# Patient Record
Sex: MALE | Race: BLACK OR AFRICAN AMERICAN | Employment: PART TIME | URBAN - METROPOLITAN AREA
[De-identification: names, ages, dates, MRNs, and addresses within clinical notes are randomized per-mention and may not be internally consistent; named-entity substitution may affect disease eponyms.]

---

## 2020-01-31 ENCOUNTER — APPOINTMENT (OUTPATIENT)
Dept: CT IMAGING | Age: 30
End: 2020-01-31
Attending: EMERGENCY MEDICINE
Payer: SELF-PAY

## 2020-01-31 ENCOUNTER — HOSPITAL ENCOUNTER (EMERGENCY)
Age: 30
Discharge: HOME | End: 2020-01-31
Attending: EMERGENCY MEDICINE
Payer: SELF-PAY

## 2020-01-31 VITALS
BODY MASS INDEX: 32.2 KG/M2 | TEMPERATURE: 97.9 F | HEART RATE: 65 BPM | SYSTOLIC BLOOD PRESSURE: 142 MMHG | WEIGHT: 230 LBS | HEIGHT: 71 IN | OXYGEN SATURATION: 100 % | RESPIRATION RATE: 16 BRPM | DIASTOLIC BLOOD PRESSURE: 56 MMHG

## 2020-01-31 DIAGNOSIS — R42 DIZZINESS: Primary | ICD-10-CM

## 2020-01-31 DIAGNOSIS — H66.009 ACUTE SUPPURATIVE OTITIS MEDIA WITHOUT SPONTANEOUS RUPTURE OF EAR DRUM, RECURRENCE NOT SPECIFIED, UNSPECIFIED LATERALITY: ICD-10-CM

## 2020-01-31 LAB
GLUCOSE BLD STRIP.AUTO-MCNC: 102 MG/DL (ref 65–100)
SERVICE CMNT-IMP: ABNORMAL

## 2020-01-31 PROCEDURE — 82962 GLUCOSE BLOOD TEST: CPT

## 2020-01-31 PROCEDURE — 74011250636 HC RX REV CODE- 250/636: Performed by: EMERGENCY MEDICINE

## 2020-01-31 PROCEDURE — 93005 ELECTROCARDIOGRAM TRACING: CPT

## 2020-01-31 PROCEDURE — 70450 CT HEAD/BRAIN W/O DYE: CPT

## 2020-01-31 PROCEDURE — 99284 EMERGENCY DEPT VISIT MOD MDM: CPT

## 2020-01-31 RX ORDER — MECLIZINE HCL 12.5 MG 12.5 MG/1
25 TABLET ORAL
Status: COMPLETED | OUTPATIENT
Start: 2020-01-31 | End: 2020-01-31

## 2020-01-31 RX ORDER — MECLIZINE HYDROCHLORIDE 25 MG/1
25 TABLET ORAL
Qty: 20 TAB | Refills: 0 | Status: SHIPPED | OUTPATIENT
Start: 2020-01-31 | End: 2020-02-16

## 2020-01-31 RX ORDER — AMOXICILLIN 500 MG/1
500 TABLET, FILM COATED ORAL 3 TIMES DAILY
Qty: 30 TAB | Refills: 0 | Status: SHIPPED | OUTPATIENT
Start: 2020-01-31 | End: 2022-10-28

## 2020-01-31 RX ADMIN — MECLIZINE 25 MG: 12.5 TABLET ORAL at 19:03

## 2020-01-31 NOTE — ED PROVIDER NOTES
EMERGENCY DEPARTMENT HISTORY AND PHYSICAL EXAM      Date: 1/31/2020  Patient Name: Cameron Woodward    History of Presenting Illness     Chief Complaint   Patient presents with    Hypertension    Dizziness       History Provided By: Patient    HPI: Cameron Woodward, 34 y.o. male with PMHx significant for no medical history, presents by private vehicle to the ED with cc of dizziness. This is a 70-year-old male with dizziness that started about 6 hours ago. He feels off balance and has some trouble walking. He has no history of head injury though he had some vomiting about 2 hours ago. He states his blood pressure was a concern because of the dizziness. He assumed that elevated blood pressure is what was causing his dizziness. There is no indication that he has had any elevated blood pressures. Patient does have some mild right ear pain and fullness with no drainage. There are no other complaints, changes, or physical findings at this time. PCP: Cipriano oMss MD        Past History     Past Medical History:  History reviewed. No pertinent past medical history. Past Surgical History:  History reviewed. No pertinent surgical history. Family History:  History reviewed. No pertinent family history. Social History:  Social History     Tobacco Use    Smoking status: Never Smoker    Smokeless tobacco: Never Used   Substance Use Topics    Alcohol use: Not Currently    Drug use: Not Currently       Allergies: Allergies   Allergen Reactions    Dog Dander Sneezing    Pollen Extracts Itching and Sneezing         Review of Systems   Review of Systems   Constitutional: Negative for chills and fever. HENT: Negative for congestion, rhinorrhea, sneezing and sore throat. Eyes: Negative for redness and visual disturbance. Respiratory: Negative for shortness of breath. Cardiovascular: Negative for chest pain and leg swelling.    Gastrointestinal: Negative for abdominal pain, nausea and vomiting. Genitourinary: Negative for difficulty urinating and frequency. Musculoskeletal: Negative for back pain, myalgias and neck stiffness. Skin: Negative for rash. Neurological: Positive for dizziness. Negative for syncope, weakness and headaches. Hematological: Negative for adenopathy. All other systems reviewed and are negative. Physical Exam   Physical Exam  Vitals signs and nursing note reviewed. Constitutional:       Appearance: Normal appearance. He is well-developed. HENT:      Head: Normocephalic and atraumatic. Ears:      Comments: Serous effusion is noted of the right ear. Neck:      Musculoskeletal: Full passive range of motion without pain, normal range of motion and neck supple. Cardiovascular:      Rate and Rhythm: Normal rate and regular rhythm. Pulses: Normal pulses. Heart sounds: Normal heart sounds. No murmur. Pulmonary:      Effort: Pulmonary effort is normal. No respiratory distress. Breath sounds: Normal breath sounds. Chest:      Chest wall: No tenderness. Abdominal:      General: Bowel sounds are normal.      Palpations: Abdomen is soft. Tenderness: There is no abdominal tenderness. There is no guarding or rebound. Skin:     General: Skin is warm and dry. Findings: No erythema or rash. Neurological:      Mental Status: He is alert and oriented to person, place, and time. Psychiatric:         Speech: Speech normal.         Behavior: Behavior normal.         Thought Content: Thought content normal.         Judgment: Judgment normal.         Diagnostic Study Results     Labs -     No results found for this or any previous visit (from the past 12 hour(s)). Radiologic Studies -   CT HEAD WO CONT   Final Result   IMPRESSION: No acute intracranial hemorrhage, mass or infarct.              CT Results  (Last 48 hours)               01/31/20 1919  CT HEAD WO CONT Final result    Impression:  IMPRESSION: No acute intracranial hemorrhage, mass or infarct. Narrative:  INDICATION: dizziness        Exam: Noncontrast CT of the brain is performed with 5 mm collimation. CT dose reduction was achieved with the use of the standardized protocol   tailored for this examination and automatic exposure control for dose   modulation. FINDINGS: There is no acute intracranial hemorrhage, mass, mass effect or   herniation. Ventricular system is normal. The gray-white matter differentiation   is well-preserved. The mastoid air cells are well pneumatized. The visualized   paranasal sinuses are normal.               CXR Results  (Last 48 hours)    None            Medical Decision Making   I am the first provider for this patient. I reviewed the vital signs, available nursing notes, past medical history, past surgical history, family history and social history. Vital Signs-Reviewed the patient's vital signs. No data found. Records Reviewed: Nursing Notes    Provider Notes (Medical Decision Making): Intracranial process causing dizziness and vomiting versus hypoglycemia versus abnormal heart rhythm. EKG shows a normal sinus rhythm at 63 bpm.  Fingerstick glucose was right around 100. CT head is also performed today. ED Course:   Initial assessment performed. The patients presenting problems have been discussed, and they are in agreement with the care plan formulated and outlined with them. I have encouraged them to ask questions as they arise throughout their visit. Disposition:  Patient informed of results of workup and is comfortable with discharge to home to follow up with PCP. They are instructed to return as needed for worsening condition. PLAN:  1. Discharge Medication List as of 1/31/2020  7:30 PM      START taking these medications    Details   amoxicillin 500 mg tab Take 500 mg by mouth three (3) times daily. , Normal, Disp-30 Tab, R-0      meclizine (ANTIVERT) 25 mg tablet Take 1 Tab by mouth three (3) times daily as needed for Dizziness., Normal, Disp-20 Tab, R-0           2. Follow-up Information     Follow up With Specialties Details Why Harriett Rivera MD Orthopedic Surgery Schedule an appointment as soon as possible for a visit  [address]  [phone]      [facility] EMERGENCY DEPT Emergency Medicine  As needed, If symptoms worsen Lev Alberto  [phone]        Return to ED if worse     Diagnosis     Clinical Impression:   1. Dizziness    2.  Acute suppurative otitis media without spontaneous rupture of ear drum, recurrence not specified, unspecified laterality

## 2020-01-31 NOTE — LETTER
[facility] EMERGENCY DEPT 
[address] [facility] [address] 
[phone] Work/School Note Date: 1/31/2020 To Whom It May concern: 
 
Megan Castorena was seen and treated today in the emergency room by the following provider(s): 
Attending Provider: Chey No MD.   
 
Megan Castorena may return to work on 2/3/20. Sincerely, Darrion Mcarthur RN

## 2020-01-31 NOTE — DISCHARGE INSTRUCTIONS
Patient Education        Dizziness: Care Instructions  Your Care Instructions  Dizziness is the feeling of unsteadiness or fuzziness in your head. It is different than having vertigo, which is a feeling that the room is spinning or that you are moving or falling. It is also different from lightheadedness, which is the feeling that you are about to faint. It can be hard to know what causes dizziness. Some people feel dizzy when they have migraine headaches. Sometimes bouts of flu can make you feel dizzy. Some medical conditions, such as heart problems or high blood pressure, can make you feel dizzy. Many medicines can cause dizziness, including medicines for high blood pressure, pain, or anxiety. If a medicine causes your symptoms, your doctor may recommend that you stop or change the medicine. If it is a problem with your heart, you may need medicine to help your heart work better. If there is no clear reason for your symptoms, your doctor may suggest watching and waiting for a while to see if the dizziness goes away on its own. Follow-up care is a key part of your treatment and safety. Be sure to make and go to all appointments, and call your doctor if you are having problems. It's also a good idea to know your test results and keep a list of the medicines you take. How can you care for yourself at home? · If your doctor recommends or prescribes medicine, take it exactly as directed. Call your doctor if you think you are having a problem with your medicine. · Do not drive while you feel dizzy. · Try to prevent falls. Steps you can take include:  ? Using nonskid mats, adding grab bars near the tub, and using night-lights. ? Clearing your home so that walkways are free of anything you might trip on.  ? Letting family and friends know that you have been feeling dizzy. This will help them know how to help you. When should you call for help? Call 911 anytime you think you may need emergency care.  For example, call if:    · You passed out (lost consciousness).     · You have dizziness along with symptoms of a heart attack. These may include:  ? Chest pain or pressure, or a strange feeling in the chest.  ? Sweating. ? Shortness of breath. ? Nausea or vomiting. ? Pain, pressure, or a strange feeling in the back, neck, jaw, or upper belly or in one or both shoulders or arms. ? Lightheadedness or sudden weakness. ? A fast or irregular heartbeat.     · You have symptoms of a stroke. These may include:  ? Sudden numbness, tingling, weakness, or loss of movement in your face, arm, or leg, especially on only one side of your body. ? Sudden vision changes. ? Sudden trouble speaking. ? Sudden confusion or trouble understanding simple statements. ? Sudden problems with walking or balance. ? A sudden, severe headache that is different from past headaches.    Call your doctor now or seek immediate medical care if:    · You feel dizzy and have a fever, headache, or ringing in your ears.     · You have new or increased nausea and vomiting.     · Your dizziness does not go away or comes back.    Watch closely for changes in your health, and be sure to contact your doctor if:    · You do not get better as expected. Where can you learn more? Go to http://kory-alysha.info/. Enter A887 in the search box to learn more about \"Dizziness: Care Instructions. \"  Current as of: June 26, 2019  Content Version: 12.2  © 3300-7691 [employer]. Care instructions adapted under license by [employer] (which disclaims liability or warranty for this information). If you have questions about a medical condition or this instruction, always ask your healthcare professional. [place] [address] any warranty or liability for your use of this information.          Patient Education        Vertigo: Care Instructions  Your Care Instructions    Vertigo is the feeling that you or your surroundings are moving when there is no actual movement. It is often described as a feeling of spinning, whirling, falling, or tilting. Vertigo may make you vomit or feel nauseated. You may have trouble standing or walking and may lose your balance. Vertigo is often related to an inner ear problem, but it can have other more serious causes. If vertigo continues, you may need more tests to find its cause. Follow-up care is a key part of your treatment and safety. Be sure to make and go to all appointments, and call your doctor if you are having problems. It's also a good idea to know your test results and keep a list of the medicines you take. How can you care for yourself at home? · Do not lie flat on your back. Prop yourself up slightly. This may reduce the spinning feeling. Keep your eyes open. · Move slowly so that you do not fall. · If your doctor recommends medicine, take it exactly as directed. · Do not drive while you are having vertigo. Certain exercises, called Bass-Daroff exercises, can help decrease vertigo. To do Bass-Daroff exercises:  · Sit on the edge of a bed or sofa and quickly lie down on the side that causes the worst vertigo. Lie on your side with your ear down. · Stay in this position for at least 30 seconds or until the vertigo goes away. · Sit up. If this causes vertigo, wait for it to stop. · Repeat the procedure on the other side. · Repeat this 10 times. Do these exercises 2 times a day until the vertigo is gone. When should you call for help? Call 911 anytime you think you may need emergency care. For example, call if:    · You passed out (lost consciousness).     · You have symptoms of a stroke. These may include:  ? Sudden numbness, tingling, weakness, or loss of movement in your face, arm, or leg, especially on only one side of your body. ? Sudden vision changes. ? Sudden trouble speaking. ? Sudden confusion or trouble understanding simple statements.   ? Sudden problems with walking or balance. ? A sudden, severe headache that is different from past headaches.    Call your doctor now or seek immediate medical care if:    · Vertigo occurs with a fever, a headache, or ringing in your ears.     · You have new or increased nausea and vomiting.    Watch closely for changes in your health, and be sure to contact your doctor if:    · Vertigo gets worse or happens more often.     · Vertigo has not gotten better after 2 weeks. Where can you learn more? Go to http://kory-alysha.info/. Enter X443 in the search box to learn more about \"Vertigo: Care Instructions. \"  Current as of: October 21, 2018  Content Version: 12.2  © 9957-7394 [employer]. Care instructions adapted under license by [employer] (which disclaims liability or warranty for this information). If you have questions about a medical condition or this instruction, always ask your healthcare professional. [address] any warranty or liability for your use of this information.

## 2020-02-01 LAB
ATRIAL RATE: 63 BPM
CALCULATED P AXIS, ECG09: 58 DEGREES
CALCULATED R AXIS, ECG10: 66 DEGREES
CALCULATED T AXIS, ECG11: 27 DEGREES
DIAGNOSIS, 93000: NORMAL
P-R INTERVAL, ECG05: 158 MS
Q-T INTERVAL, ECG07: 406 MS
QRS DURATION, ECG06: 88 MS
QTC CALCULATION (BEZET), ECG08: 415 MS
VENTRICULAR RATE, ECG03: 63 BPM

## 2020-02-01 NOTE — ED NOTES
Patient here with c/o dizziness. Patient states symptoms x3 days. Patient denies injury or fall. States symptoms have been intermittent each day. Patient reports vomiting x2 episodes this afternoon. Denies fevers. Patient reports hearing loss and irritation to right ear. Denies hx of previous health problems. Denies being on any home medications. Denies smoking, ETOH, or substance abuse. Patient's wife states he ate a salad around 11am today and she states that he ate ten ice cream Snickers bars last night. Emergency Department Nursing Plan of Care       The Nursing Plan of Care is developed from the Nursing assessment and Emergency Department Attending provider initial evaluation. The plan of care may be reviewed in the ED Provider note.     The Plan of Care was developed with the following considerations:   Patient / Family readiness to learn indicated by:verbalized understanding  Persons(s) to be included in education: patient  Barriers to Learning/Limitations:No    Signed     Olivia Wang RN    1/31/2020   7:29 PM

## 2020-02-01 NOTE — ED NOTES
Discharge instructions were given to the patient by Narendra Wellington RN. The patient left the Emergency Department ambulatory, alert and oriented and in no acute distress with 2 electronic prescriptions. The patient was encouraged to call or return to the ED for worsening issues or problems and was encouraged to schedule a follow up appointment for continuing care. The patient verbalized understanding of discharge instructions and prescriptions, all questions were answered. The patient has no further concerns at this time.

## 2020-02-01 NOTE — ED NOTES
Bedside and Verbal shift change report given to Koko Carvajal RN (oncoming nurse) by Sam Sherwood RN (offgoing nurse). Report included the following information SBAR, Kardex, ED Summary, Procedure Summary, MAR and Recent Results.

## 2020-02-16 ENCOUNTER — HOSPITAL ENCOUNTER (EMERGENCY)
Age: 30
Discharge: HOME | End: 2020-02-16
Attending: EMERGENCY MEDICINE
Payer: SELF-PAY

## 2020-02-16 VITALS
BODY MASS INDEX: 39.2 KG/M2 | SYSTOLIC BLOOD PRESSURE: 125 MMHG | DIASTOLIC BLOOD PRESSURE: 96 MMHG | TEMPERATURE: 97.7 F | RESPIRATION RATE: 18 BRPM | HEART RATE: 73 BPM | HEIGHT: 71 IN | WEIGHT: 280 LBS | OXYGEN SATURATION: 98 %

## 2020-02-16 DIAGNOSIS — H61.21 EXCESSIVE CERUMEN IN RIGHT EAR CANAL: ICD-10-CM

## 2020-02-16 DIAGNOSIS — H81.01 MENIERE DISEASE, RIGHT: Primary | ICD-10-CM

## 2020-02-16 PROCEDURE — 99283 EMERGENCY DEPT VISIT LOW MDM: CPT

## 2020-02-16 PROCEDURE — 76010010392 HC REMOVAL IMPACTED WAX IRRIGATION/LVG UNI

## 2020-02-16 PROCEDURE — 74011250637 HC RX REV CODE- 250/637: Performed by: EMERGENCY MEDICINE

## 2020-02-16 RX ORDER — DOCUSATE SODIUM 50 MG/5ML
100 LIQUID ORAL
Status: COMPLETED | OUTPATIENT
Start: 2020-02-16 | End: 2020-02-16

## 2020-02-16 RX ORDER — FLUTICASONE PROPIONATE 50 MCG
2 SPRAY, SUSPENSION (ML) NASAL DAILY
Qty: 1 BOTTLE | Refills: 0 | Status: SHIPPED | OUTPATIENT
Start: 2020-02-16

## 2020-02-16 RX ORDER — CETIRIZINE HCL 10 MG
10 TABLET ORAL DAILY
Qty: 30 TAB | Refills: 0 | Status: SHIPPED | OUTPATIENT
Start: 2020-02-16 | End: 2022-10-28

## 2020-02-16 RX ORDER — TRIAMTERENE AND HYDROCHLOROTHIAZIDE 37.5; 25 MG/1; MG/1
1 CAPSULE ORAL DAILY
Qty: 30 CAP | Refills: 0 | Status: SHIPPED | OUTPATIENT
Start: 2020-02-16 | End: 2022-10-28

## 2020-02-16 RX ORDER — DOCUSATE SODIUM 100 MG/1
100 CAPSULE, LIQUID FILLED ORAL
Status: DISCONTINUED | OUTPATIENT
Start: 2020-02-16 | End: 2020-02-16

## 2020-02-16 RX ORDER — MECLIZINE HYDROCHLORIDE 25 MG/1
25 TABLET ORAL
Qty: 30 TAB | Refills: 0 | Status: SHIPPED | OUTPATIENT
Start: 2020-02-16

## 2020-02-16 RX ADMIN — DOCUSATE SODIUM 100 MG: 50 LIQUID ORAL at 10:41

## 2020-02-16 NOTE — ED PROVIDER NOTES
EMERGENCY DEPARTMENT HISTORY AND PHYSICAL EXAM      Date: 2/16/2020  Patient Name: Wojciech Ventura    History of Presenting Illness     Chief Complaint   Patient presents with    Dizziness     guicho four months    Ear Pain     right ear \"ringing\"       History Provided By: Patient and Girlfriend    HPI: Wojciech Ventura, 34 y.o. male presents to the ED with cc of recurrent dizziness, decreased hearing and fullness in his ear. The patient states he has been having these symptoms for at least a year. He in fact was here recently and had a work-up including head CT that was unremarkable. He does have a history of sinus allergies. He denies any current sinus pain, headache, diplopia, dysarthria or ataxia. The dizziness is described as room spinning and does last for over 20 to 30 minutes at a time. It does appear to be positional at times. He has concurrent ringing in his ear. He denies any associated ear pain. No family history of aneurysms or cancer. There are no other complaints, changes, or physical findings at this time. PCP: Jesse Fernandez MD    No current facility-administered medications on file prior to encounter. Current Outpatient Medications on File Prior to Encounter   Medication Sig Dispense Refill    amoxicillin 500 mg tab Take 500 mg by mouth three (3) times daily. 30 Tab 0    [DISCONTINUED] meclizine (ANTIVERT) 25 mg tablet Take 1 Tab by mouth three (3) times daily as needed for Dizziness. 20 Tab 0       Past History     Past Medical History:  No past medical history on file. Past Surgical History:  No past surgical history on file. Family History:  No family history on file. Social History:  Social History     Tobacco Use    Smoking status: Never Smoker    Smokeless tobacco: Never Used   Substance Use Topics    Alcohol use: Not Currently    Drug use: Not Currently       Allergies:   Allergies   Allergen Reactions    Dog Dander Sneezing    Pollen Extracts Itching and Sneezing         Review of Systems   Review of Systems   Constitutional: Negative. Negative for chills and fever. HENT: Positive for hearing loss. Negative for congestion, ear pain, rhinorrhea and sinus pressure. Eyes: Negative. Negative for photophobia, discharge and redness. Respiratory: Negative. Negative for chest tightness and shortness of breath. Cardiovascular: Negative. Negative for chest pain. Gastrointestinal: Negative. Negative for abdominal pain, blood in stool, nausea and vomiting. Endocrine: Negative. Genitourinary: Negative. Negative for flank pain and hematuria. Musculoskeletal: Negative. Negative for back pain. Skin: Negative. Negative for rash. Neurological: Positive for dizziness. Negative for seizures, speech difficulty, weakness, numbness and headaches. Hematological: Negative. All other systems reviewed and are negative. Physical Exam   Physical Exam  Vitals signs and nursing note reviewed. Constitutional:       General: He is not in acute distress. Appearance: He is well-developed. He is not diaphoretic. HENT:      Head: Normocephalic and atraumatic. Right Ear: There is impacted cerumen. No mastoid tenderness. Left Ear: Hearing and tympanic membrane normal. There is no impacted cerumen. No mastoid tenderness. Tympanic membrane is not injected or scarred. Nose: Nose normal.      Mouth/Throat:      Pharynx: No oropharyngeal exudate. Eyes:      General: No scleral icterus. Conjunctiva/sclera: Conjunctivae normal.      Pupils: Pupils are equal, round, and reactive to light. Neck:      Musculoskeletal: Normal range of motion and neck supple. Thyroid: No thyromegaly. Vascular: No JVD. Cardiovascular:      Rate and Rhythm: Normal rate and regular rhythm. Chest Wall: PMI is not displaced. Pulses: Normal pulses. Heart sounds: Normal heart sounds. No murmur. No friction rub. No gallop. Pulmonary:      Effort: Pulmonary effort is normal. No respiratory distress. Breath sounds: Normal breath sounds. No stridor. No decreased breath sounds, wheezing, rhonchi or rales. Chest:      Chest wall: No tenderness. Abdominal:      General: Bowel sounds are normal. There is no distension or abdominal bruit. Palpations: Abdomen is soft. There is no mass. Tenderness: There is no abdominal tenderness. There is no guarding or rebound. Hernia: No hernia is present. Skin:     General: Skin is warm. Findings: No erythema or rash. Neurological:      Mental Status: He is alert and oriented to person, place, and time. GCS: GCS eye subscore is 4. GCS verbal subscore is 5. GCS motor subscore is 6. Cranial Nerves: No cranial nerve deficit. Sensory: No sensory deficit. Motor: No tremor, atrophy, abnormal muscle tone or seizure activity. Coordination: Coordination normal.      Deep Tendon Reflexes: Reflexes are normal and symmetric. Reflexes normal.      Reflex Scores:       Patellar reflexes are 2+ on the right side and 2+ on the left side. Comments: Not able to elicit dizziness with tilting and turning them patient's head from an upright to supine position       11:25 AM after review of up-to-date literature will initiate triamterene hydrochlorothiazide to treat presumed Ménière's disease. Patient made aware of this plan and agrees. We will also treat for sinus allergies. At this point we will withhold antibiotics as he just had antibiotics for 10 days. Reinforced the importance of following up with ear nose and throat doctor. 12:04 PM states she was able to remove a goodly amount of wax from the patient's ear. Plan will continue to be the same to treat for possible Ménière's follow-up ENT. Diagnostic Study Results     Labs -   No results found for this or any previous visit (from the past 12 hour(s)).     Radiologic Studies -   No orders to display CT Results  (Last 48 hours)    None        CXR Results  (Last 48 hours)    None          Medical Decision Making   I am the first provider for this patient. I reviewed the vital signs, available nursing notes, past medical history, past surgical history, family history and social history. Vital Signs-Reviewed the patient's vital signs. Patient Vitals for the past 12 hrs:   Temp Pulse Resp BP SpO2   02/16/20 0950 97.7 °F (36.5 °C) 73 18 (!) 126/93 98 %             Records Reviewed: Nursing Notes, Old Medical Records, Previous Radiology Studies and Previous Laboratory Studies    Provider Notes (Medical Decision Making):   Differential diagnosis-knee areas disease, labyrinthitis, cerumen impaction, otitis media, eustachian tube dysfunction, sinusitis, tumor, CVA, arrhythmia, electrolyte abnormality, anemia    Impression/plan-34year-old black male with recurrent spells of dizziness and associated decreased hearing. There is tinnitus. Exam does reveal cerumen. This may be a culprit. However there is a component of symptoms that suggest possibly Ménière's. He has had a recent head CT that was unremarkable. He denies any ear pain. Throughout his presentation he has not followed up with ENT. He was on antibiotics. He does get some relief with meclizine. I am will be to treat his cerumen impaction. May initiate thiazide diuretic to help with salt extraction. Will encourage ENT follow-up. ED Course:   Initial assessment performed. The patients presenting problems have been discussed, and they are in agreement with the care plan formulated and outlined with them. I have encouraged them to ask questions as they arise throughout their visit. Critical Care Time:   0    Disposition:  Home    PLAN:  1. Current Discharge Medication List      START taking these medications    Details   triamterene-hydroCHLOROthiazide (DYAZIDE) 37.5-25 mg per capsule Take 1 Cap by mouth daily.   Qty: 30 Cap, Refills: 0      fluticasone propionate (FLONASE) 50 mcg/actuation nasal spray 2 Sprays by Both Nostrils route daily. Qty: 1 Bottle, Refills: 0      cetirizine (ZYRTEC) 10 mg tablet Take 1 Tab by mouth daily. Qty: 30 Tab, Refills: 0         CONTINUE these medications which have CHANGED    Details   meclizine (ANTIVERT) 25 mg tablet Take 1 Tab by mouth three (3) times daily as needed for Dizziness. Qty: 30 Tab, Refills: 0           2. Follow-up Information     Follow up With Specialties Details Why Mark Larios MD Otolaryngology Schedule an appointment as soon as possible for a visit in 1 day  [address]  [facility] 7 [phone]      [facility] EM[facility]GENCY DEPT Emergency Medicine  If symptoms worsen [place]  [phone]        Return to ED if worse     Diagnosis     Clinical Impression:   1. Meniere disease, right    2. Excessive cerumen in right ear canal        Attestations:    Macho Mai MD    Please note that this dictation was completed with [employer], the [employer] voice recognition software. Quite often unanticipated grammatical, syntax, homophones, and other interpretive errors are inadvertently transcribed by the computer software. Please disregard these errors. Please excuse any errors that have escaped final proofreading. Thank you.

## 2020-02-16 NOTE — DISCHARGE INSTRUCTIONS
Patient Education        Ménière's Disease: Care Instructions  Your Care Instructions    Ménière's (say \"men-YEERS\") disease is a problem of the inner ear that affects hearing and balance. It causes sudden attacks of vertigo that make you feel like you are spinning. It can also cause a loud ringing in the ears called tinnitus, a temporary loss of hearing, and a feeling of fullness in the ear. Your hearing loss may not get better. The cause of Ménière's disease is not known, but it may be related to a fluid imbalance in the inner ear. The goal of treatment is to make the vertigo less severe until the attack ends. Some people can prevent attacks by eating a diet low in sodium and by doing exercises to improve balance. Medicines may also help. Surgery is an option for some people. Follow-up care is a key part of your treatment and safety. Be sure to make and go to all appointments, and call your doctor if you are having problems. It's also a good idea to know your test results and keep a list of the medicines you take. How can you care for yourself at home? · During an attack of vertigo, lie down and hold your head very still until the feeling passes. This may help you cope with vertigo. · Take your medicines exactly as prescribed. Call your doctor if you think you are having a problem with your medicine. You will get more details on the specific medicines your doctor prescribes. · Avoid caffeine, alcohol, tobacco, and stress, along with any other substances or conditions that trigger an attack. · Eat a diet low in sodium to reduce fluid buildup in the inner ear. · Do exercises to improve your balance. These can help ease vertigo. ? Stand with your feet together, arms at your sides, and hold this position for 30 seconds. ? For slightly harder exercise, stand with your feet together and arms at your sides while slowly moving your head up and down and side to side. ? Keep a chart to track your progress.  It can make you aware of any improvements. Include the date, the time you spent exercising, how often your eyes were open or closed, and how you felt during each exercise. ? Walk 5 steps and then stop abruptly. Wait for any dizzy feeling to go away, and do it again. Repeat until you have walked about 50 feet. Walking exercises for vertigo may improve your balance and your symptoms of vertigo. You may want to have someone next to you while you do these exercises in case you lose your balance. ? For another walking exercise, walk 5 steps, turn, and walk back. Wait for any dizziness to go away. Repeat 5 more times. ? For harder exercise, walk and turn your head to the right or left with every other step. Try to walk about 50 feet. Repeat the exercise while moving your head up and down. Then repeat while moving your head up to one side and down to the other side, and then switching. ? Prepare a list that shows the distance you walked, how often you walked, and how you felt while you were walking. · Make sure your home is safe for those times when you have an attack of vertigo. ? Get rid of throw rugs, and use nonskid mats. ? Use grab bars near the bathtub and toilet. ? Use night-lights. ? Keep floors dry to prevent slipping. ? Store items you use often on low shelves so you do not have to climb or reach high. If you have to climb, use a step stool with handrails. ? Keep driveways, sidewalks, and other walkways clear of things that might cause you to trip. · There are other steps you can take to stay safe. ? Avoid driving or working at heights. ? Wear shoes with low heels and nonslip soles. ? Keep your shoes tied. ? Alert family and friends to your condition. ? Know whether medicines you take can affect your sense of balance. When should you call for help? Call 911 anytime you think you may need emergency care.  For example, call if:    · You passed out (lost consciousness).     · You have symptoms of a stroke. These may include:  ? Sudden numbness, tingling, weakness, or loss of movement in your face, arm, or leg, especially on only one side of your body. ? Sudden vision changes. ? Sudden trouble speaking. ? Sudden confusion or trouble understanding simple statements. ? Sudden problems with walking or balance. ? A sudden, severe headache that is different from past headaches.    Call your doctor now or seek immediate medical care if:    · You have new or worse dizziness.     · You notice changes in your hearing.    Watch closely for changes in your health, and be sure to contact your doctor if:    · You have new or worse nausea or vomiting.     · Your vertigo gets worse.     · You do not get better as expected. Where can you learn more? Go to http://kory-alysha.info/. Enter B908 in the search box to learn more about \"Ménière's Disease: Care Instructions. \"  Current as of: October 21, 2018  Content Version: 12.2  © 4246-2875 [employer]. Care instructions adapted under license by [employer] (which disclaims liability or warranty for this information). If you have questions about a medical condition or this instruction, always ask your healthcare professional. [place] [address] any warranty or liability for your use of this information. Patient Education        Earwax Blockage: Care Instructions  Your Care Instructions    Earwax is a natural substance that protects the ear canal. Normally, earwax drains from the ears and does not cause problems. Sometimes earwax builds up and hardens. Earwax blockage (also called cerumen impaction) can cause some loss of hearing and pain. When wax is tightly packed, you will need to have your doctor remove it. Follow-up care is a key part of your treatment and safety. Be sure to make and go to all appointments, and call your doctor if you are having problems.  It's also a good idea to know your test results and keep a list of the medicines you take. How can you care for yourself at home? · Do not try to remove earwax with cotton swabs, fingers, or other objects. This can make the blockage worse and damage the eardrum. · If your doctor recommends that you try to remove earwax at home:  ? Soften and loosen the earwax with warm mineral oil. You also can try hydrogen peroxide mixed with an equal amount of room temperature water. Place 2 drops of the fluid, warmed to body temperature, in the ear two times a day for up to 5 days. ? Once the wax is loose and soft, all that is usually needed to remove it from the ear canal is a gentle, warm shower. Direct the water into the ear, then tip your head to let the earwax drain out. Dry your ear thoroughly with a hair dryer set on low. Hold the dryer several inches from your ear. ? If the warm mineral oil and shower do not work, use an over-the-counter wax softener. Read and follow all instructions on the label. After using the wax softener, use an ear syringe to gently flush the ear. Make sure the flushing solution is body temperature. Cool or hot fluids in the ear can cause dizziness. When should you call for help? Call your doctor now or seek immediate medical care if:    · Pus or blood drains from your ear.     · Your ears are ringing or feel full.     · You have a loss of hearing.    Watch closely for changes in your health, and be sure to contact your doctor if:    · You have pain or reduced hearing after 1 week of home treatment.     · You have any new symptoms, such as nausea or balance problems. Where can you learn more? Go to http://kory-alysha.info/. Enter K902 in the search box to learn more about \"Earwax Blockage: Care Instructions. \"  Current as of: June 26, 2019  Content Version: 12.2  © 7970-0450 [employer].  Care instructions adapted under license by [employer] (which disclaims liability or warranty for this information). If you have questions about a medical condition or this instruction, always ask your healthcare professional. [address] any warranty or liability for your use of this information. [employer] Activation    Thank you for requesting access to [employer]. Please follow the instructions below to securely access and download your online medical record. [employer] allows you to send messages to your doctor, view your test results, renew your prescriptions, schedule appointments, and more. How Do I Sign Up? 1. In your internet browser, go to [email or web address])  2. Click on the First Time User? Click Here link in the Sign In box. You will be redirect to the New Member Sign Up page. 3. Enter your [employer] Access Code exactly as it appears below. You will not need to use this code after youve completed the sign-up process. If you do not sign up before the expiration date, you must request a new code. [employer] Access Code: [email or web address]  Expires: 3/16/2020  5:40 PM (This is the date your [employer] access code will )    4. Enter the last four digits of your Social Security Number (xxxx) and Date of Birth (mm/dd/yyyy) as indicated and click Submit. You will be taken to the next sign-up page. 5. Create a [employer] ID. This will be your [employer] login ID and cannot be changed, so think of one that is secure and easy to remember. 6. Create a [employer] password. You can change your password at any time. 7. Enter your Password Reset Question and Answer. This can be used at a later time if you forget your password. 8. Enter your e-mail address. You will receive e-mail notification when new information is available in [address] 9. Click Sign Up. You can now view and download portions of your medical record. 10. Click the Download Summary menu link to download a portable copy of your medical information.     Additional Information    If you have questions, please visit the Frequently Asked Questions section of the [employer] website at [email or web address] Remember, [employer] is NOT to be used for urgent needs. For medical emergencies, dial 911.

## 2020-02-16 NOTE — LETTER
[address] EMERGENCY DEPT 
[address] [place] [id number] 
[phone] Work/School Note Date: 2/16/2020 To Whom It May concern: 
 
Gabby Yang was seen and treated today in the emergency room by the following provider(s): 
Attending Provider: Rosalba Moya MD.   
 
Gabby Yang may return to work on February 18, 2020. Sincerely, Jesusita Yeager MD

## 2020-02-16 NOTE — ED NOTES
Patient (s) 1 given copy of dc instructions and 0 paper script(s) and 4electronic scripts. Patient (s)  verbalized understanding of instructions and script (s). Patient given a current medication reconciliation form and verbalized understanding of their medications. Patient (s) verbalized understanding of the importance of discussing medications with  his or her physician or clinic they will be following up with. Patient alert and oriented and in no acute distress.

## 2020-02-16 NOTE — ED NOTES
Patient presents to ED with c/o right ear pain and dizziness for four months. Patient seen here recently for same issue and prescribed medication that he took that he states that helped some what. Patient is alert and oriented x 4 and in no acute distress at this time. Respirations are at a regular rate, depth, and pattern. Patient updated on plan of care and has no questions or concerns at this time. Call bell within reach. Will continue to monitor. Please reference nursing assessment. Emergency Department Nursing Plan of Care       The Nursing Plan of Care is developed from the Nursing assessment and Emergency Department Attending provider initial evaluation. The plan of care may be reviewed in the ED Provider note.     The Plan of Care was developed with the following considerations:   Patient / Family readiness to learn indicated by:verbalized understanding and successful return demonstration  Persons(s) to be included in education: patient  Barriers to Learning/Limitations:No    Signed     [address]unez RN    2/16/2020   10:17 AM

## 2022-10-28 ENCOUNTER — HOSPITAL ENCOUNTER (EMERGENCY)
Age: 32
Discharge: HOME | End: 2022-10-28
Attending: STUDENT IN AN ORGANIZED HEALTH CARE EDUCATION/TRAINING PROGRAM
Payer: COMMERCIAL

## 2022-10-28 VITALS
BODY MASS INDEX: 39.9 KG/M2 | SYSTOLIC BLOOD PRESSURE: 132 MMHG | HEART RATE: 78 BPM | DIASTOLIC BLOOD PRESSURE: 71 MMHG | OXYGEN SATURATION: 97 % | HEIGHT: 71 IN | TEMPERATURE: 98 F | RESPIRATION RATE: 16 BRPM | WEIGHT: 285 LBS

## 2022-10-28 DIAGNOSIS — H81.12 BPPV (BENIGN PAROXYSMAL POSITIONAL VERTIGO), LEFT: ICD-10-CM

## 2022-10-28 DIAGNOSIS — H92.01 RIGHT EAR PAIN: Primary | ICD-10-CM

## 2022-10-28 PROCEDURE — 74011250636 HC RX REV CODE- 250/636: Performed by: STUDENT IN AN ORGANIZED HEALTH CARE EDUCATION/TRAINING PROGRAM

## 2022-10-28 PROCEDURE — 99281 EMR DPT VST MAYX REQ PHY/QHP: CPT

## 2022-10-28 PROCEDURE — 99283 EMERGENCY DEPT VISIT LOW MDM: CPT

## 2022-10-28 RX ORDER — AMOXICILLIN AND CLAVULANATE POTASSIUM 875; 125 MG/1; MG/1
1 TABLET, FILM COATED ORAL 2 TIMES DAILY
Qty: 20 TABLET | Refills: 0 | Status: SHIPPED | OUTPATIENT
Start: 2022-10-28 | End: 2022-11-07

## 2022-10-28 RX ORDER — ONDANSETRON 4 MG/1
4 TABLET, ORALLY DISINTEGRATING ORAL
Qty: 20 TABLET | Refills: 0 | Status: SHIPPED | OUTPATIENT
Start: 2022-10-28

## 2022-10-28 RX ORDER — MECLIZINE HYDROCHLORIDE 25 MG/1
25 TABLET ORAL
Status: COMPLETED | OUTPATIENT
Start: 2022-10-28 | End: 2022-10-28

## 2022-10-28 RX ORDER — FEXOFENADINE HCL AND PSEUDOEPHEDRINE HCI 180; 240 MG/1; MG/1
1 TABLET, EXTENDED RELEASE ORAL DAILY
COMMUNITY

## 2022-10-28 RX ORDER — ONDANSETRON 4 MG/1
4 TABLET, ORALLY DISINTEGRATING ORAL
Status: COMPLETED | OUTPATIENT
Start: 2022-10-28 | End: 2022-10-28

## 2022-10-28 RX ORDER — MECLIZINE HYDROCHLORIDE 25 MG/1
25 TABLET ORAL
Qty: 30 TABLET | Refills: 0 | Status: SHIPPED | OUTPATIENT
Start: 2022-10-28 | End: 2022-11-07

## 2022-10-28 RX ORDER — PSEUDOEPHEDRINE HCL 30 MG
30 TABLET ORAL
COMMUNITY

## 2022-10-28 RX ADMIN — MECLIZINE HYDROCHLORIDE 25 MG: 25 TABLET ORAL at 16:59

## 2022-10-28 RX ADMIN — ONDANSETRON 4 MG: 4 TABLET, ORALLY DISINTEGRATING ORAL at 16:59

## 2022-10-28 NOTE — ED NOTES
Purposeful rounding done. Pt lying up on stretcher eyes closed respirations even and non labored. Mother and grand mother at bedside. Call bell in reach will continue to monitor.

## 2022-10-28 NOTE — ED PROVIDER NOTES
Patient is a 27-year-old male presenting to the ED with vertiginous symptoms, near. Patient works as a  and vertigo is affecting his ability to work safely. Patient has been trying to follow-up with ENT. Patient reports ear pain. Dizziness  This is a recurrent problem. The current episode started more than 1 week ago. The problem has not changed since onset. There was no focality noted. Pertinent negatives include no focal weakness, no slurred speech, no memory loss and no mental status change. Associated symptoms include nausea. Pertinent negatives include no altered mental status, no confusion and no headaches. There were no medications administered prior to arrival. Associated medical issues do not include trauma, seizures or CVA. Nausea   This is a recurrent problem. The current episode started more than 1 week ago. The problem has not changed since onset. Pertinent negatives include no headaches and no headaches. History reviewed. No pertinent past medical history. Past Surgical History:   Procedure Laterality Date    HX KNEE REPLACEMENT Left     HX ORTHOPAEDIC           History reviewed. No pertinent family history.     Social History     Socioeconomic History    Marital status: SINGLE     Spouse name: Not on file    Number of children: Not on file    Years of education: Not on file    Highest education level: Not on file   Occupational History    Not on file   Tobacco Use    Smoking status: Never    Smokeless tobacco: Never   Vaping Use    Vaping Use: Never used   Substance and Sexual Activity    Alcohol use: Not Currently    Drug use: Not Currently    Sexual activity: Not on file   Other Topics Concern    Not on file   Social History Narrative    Not on file     Social Determinants of Health     Financial Resource Strain: Not on file   Food Insecurity: Not on file   Transportation Needs: Not on file   Physical Activity: Not on file   Stress: Not on file   Social Connections: Not on file   Intimate Partner Violence: Not on file   Housing Stability: Not on file         ALLERGIES: Dog dander and Pollen extracts    Review of Systems   Constitutional: Negative. HENT:  Positive for ear pain. Eyes: Negative. Respiratory: Negative. Cardiovascular: Negative. Gastrointestinal:  Positive for nausea. Endocrine: Negative. Genitourinary: Negative. Musculoskeletal: Negative. Skin: Negative. Allergic/Immunologic: Negative. Neurological:  Positive for dizziness. Negative for focal weakness and headaches. Hematological: Negative. Psychiatric/Behavioral: Negative. Negative for confusion and memory loss. Vitals:    10/28/22 1540   BP: (!) 161/70   Pulse: 78   Resp: 16   Temp: 98 °F (36.7 °C)   SpO2: 96%   Weight: 129.3 kg (285 lb)   Height: 5' 11\" (1.803 m)            Physical Exam  Vitals and nursing note reviewed. Constitutional:       General: He is not in acute distress. Appearance: Normal appearance. HENT:      Head: Normocephalic and atraumatic. Right Ear: External ear normal. Tympanic membrane is injected. Left Ear: Hearing, tympanic membrane, ear canal and external ear normal.      Nose: Nose normal.   Eyes:      Extraocular Movements: Extraocular movements intact. Conjunctiva/sclera: Conjunctivae normal.   Cardiovascular:      Rate and Rhythm: Normal rate. Pulses: Normal pulses. Radial pulses are 2+ on the right side and 2+ on the left side. Heart sounds: Normal heart sounds. Pulmonary:      Effort: Pulmonary effort is normal.      Breath sounds: Normal breath sounds. Chest:      Chest wall: No deformity or tenderness. Abdominal:      General: Abdomen is flat. There is no distension. Tenderness: There is no abdominal tenderness. Musculoskeletal:         General: No deformity or signs of injury. Normal range of motion. Cervical back: Normal range of motion and neck supple.  No tenderness. Skin:     General: Skin is warm and dry. Capillary Refill: Capillary refill takes less than 2 seconds. Neurological:      General: No focal deficit present. Mental Status: He is alert and oriented to person, place, and time. Comments: Weir-Hallpike positive on the left   Psychiatric:         Attention and Perception: Attention normal.         Mood and Affect: Mood normal.         Behavior: Behavior normal.        MDM         MEDICATIONS GIVEN:  Medications   ondansetron (ZOFRAN ODT) tablet 4 mg (4 mg Oral Given 10/28/22 1659)   meclizine (ANTIVERT) tablet 25 mg (25 mg Oral Given 10/28/22 1659)       Differential diagnosis: BPPV, ear infection, sinus infection, vertigo, nausea and vomiting    MDM: Patient is a 27-year-old male presented to ED with ear pain, vertigo and nystagmus consistent with BPPV but also likely otitis media. Patient's symptoms improved with Epley maneuver. Meclizine and Zofran given with improvement. Work note provided. Patient stable for discharge and outpatient follow-up with ENT. Further personalized recommendations for outpatient care as below. Key discharge instructions and summary of care: You presented to ED with left ear pain dizziness vertigo and nausea. Your symptoms are complex but most likely due to BPPV. Reviewed information about this in your discharge paperwork. If you continue to have symptoms do Epley maneuver as directed in your discharge paperwork. Take meclizine for dizziness, Zofran for nausea. As you have ear pain please start taking antibiotics as you may have a otitis media. Additionally I recommend the following treatments for your allergic rhinitis/postnasal drip/allergy symptoms. Trial Zyrtec over-the-counter, generic is fine for 1 week. Also try Flonase, over-the-counter this is a steroid nasal spray. 1 spray in each nostril daily for at least 7 days. Cepacol cough and sore throat lozenges as needed for daytime cough. Additionally you can try saline nasal spray daily as needed for dry nasal passageways and allergic symptoms. The patient has been re-evaluated and feeling better. Patient is stable for discharge. All available radiology and laboratory results have been reviewed with patient and/or available family. Patient and/or family verbally conveyed their understanding and agreement of the patient's signs, symptoms, diagnosis, treatment and prognosis and additionally agree to follow-up as recommended in the discharge instructions or to return to the Emergency Department should their condition change or worsen prior to their follow-up appointment. All questions have been answered and patient and/or available family express understanding. IMPRESSION:  1. Right ear pain    2. BPPV (benign paroxysmal positional vertigo), left        DISPOSITION: Discharged     Oseas Grossman MD      Procedures

## 2022-10-28 NOTE — ED NOTES
Discharge instructions were reviewed and given to the patient. Patient given a current medication reconciliation form and verbalized understanding of their medications, side affects, medication administration, and time when due. Importance of follow-up and appointment times and dates reviewed. The patient verbalized understanding of discharge instructions and prescriptions, all questions were answered. patient has no further concerns at this time. Patient stable at time of discharge.

## 2022-10-28 NOTE — ED TRIAGE NOTES
Pt was wheeled to the treatment area accompanied by his mother. Pt states \"I have had ringing in my right ear and intermittent dizziness like room spinning for a while more than 3 weeks I tried to get in with my ENT but I was unable so I went to pt 1[facility] a few days ago and they said I had fluid in both ears so to take Sudafed and a nasal spray I have been doing that but the symptoms are getting worse. Today the dizziness is so stronger Im having trouble standing up the dizziness gets worse if I stand up or turn my head. I have had nausea and vomiting intermittently as well. I have had this before they prescribed Meclizine and cleaned my ears. \"

## 2022-10-28 NOTE — Clinical Note
[address] EMERGENCY DEPT  [address] [facility] [address] [phone]    Work/School Note    Date: 10/28/2022    To Whom It May concern:    Edi Lundberg was seen and treated today in the emergency room by the following provider(s):  Attending Provider: Behzad Rodriguez MD.      Edi Lundberg is excused from work/school on 10/28/2022 through 10/30/2022. He is medically clear to return to work/school on 10/31/2022. Sincerely,          Kailey ALMENDAREZ.  Benja Duran MD

## 2022-10-28 NOTE — DISCHARGE INSTRUCTIONS
You presented to ED with left ear pain dizziness vertigo and nausea. Your symptoms are complex but most likely due to BPPV. Reviewed information about this in your discharge paperwork. If you continue to have symptoms do Epley maneuver as directed in your discharge paperwork. Take meclizine for dizziness, Zofran for nausea. As you have ear pain please start taking antibiotics as you may have a otitis media. Additionally I recommend the following treatments for your allergic rhinitis/postnasal drip/allergy symptoms. Trial Zyrtec over-the-counter, generic is fine for 1 week. Also try Flonase, over-the-counter this is a steroid nasal spray. 1 spray in each nostril daily for at least 7 days. Cepacol cough and sore throat lozenges as needed for daytime cough. Additionally you can try saline nasal spray daily as needed for dry nasal passageways and allergic symptoms.

## 2024-01-03 ENCOUNTER — OFFICE VISIT (OUTPATIENT)
Age: 34
End: 2024-01-03

## 2024-01-03 VITALS
RESPIRATION RATE: 16 BRPM | HEIGHT: 71 IN | DIASTOLIC BLOOD PRESSURE: 77 MMHG | HEART RATE: 99 BPM | WEIGHT: 303 LBS | BODY MASS INDEX: 42.42 KG/M2 | OXYGEN SATURATION: 98 % | SYSTOLIC BLOOD PRESSURE: 133 MMHG

## 2024-01-03 DIAGNOSIS — R42 VERTIGO: Primary | ICD-10-CM

## 2024-01-03 RX ORDER — MECLIZINE HYDROCHLORIDE 25 MG/1
25 TABLET ORAL 3 TIMES DAILY PRN
Qty: 15 TABLET | Refills: 0 | Status: SHIPPED | OUTPATIENT
Start: 2024-01-03 | End: 2024-01-13

## 2024-01-03 RX ORDER — HYDROCHLOROTHIAZIDE 25 MG/1
25 TABLET ORAL DAILY
COMMUNITY

## 2024-01-03 RX ORDER — HYDROCHLOROTHIAZIDE 25 MG/1
25 TABLET ORAL EVERY MORNING
Qty: 90 TABLET | Refills: 1 | Status: SHIPPED | OUTPATIENT
Start: 2024-01-03

## 2024-01-03 NOTE — PROGRESS NOTES
Cervical back: Normal range of motion and neck supple.   Skin:     General: Skin is warm and dry.   Neurological:      Mental Status: He is alert and oriented to person, place, and time.   Psychiatric:         Mood and Affect: Mood normal.         Behavior: Behavior normal.           An electronic signature was used to authenticate this note.    --SASCHA Navarro - CNP

## 2024-01-03 NOTE — PATIENT INSTRUCTIONS
Thank you for choosing [facility]!    Prescriptions for hydrochlorothiazide and meclizine was sent to your pharmacy.    A referral to ENT was ordered.    Research various chiropractors that treat vertigo.    Follow up with your PCP if symptoms persist or become worse.